# Patient Record
Sex: MALE | Race: WHITE | Employment: PART TIME | URBAN - METROPOLITAN AREA
[De-identification: names, ages, dates, MRNs, and addresses within clinical notes are randomized per-mention and may not be internally consistent; named-entity substitution may affect disease eponyms.]

---

## 2017-09-16 PROBLEM — S30.1XXA CONTUSION, ABDOMINAL WALL: Status: ACTIVE | Noted: 2017-09-16

## 2017-09-16 PROBLEM — S20.212A CONTUSION OF LEFT CHEST WALL: Status: ACTIVE | Noted: 2017-09-16
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
## 2018-06-27 ENCOUNTER — OFFICE VISIT (OUTPATIENT)
Dept: ORTHOPEDIC SURGERY | Age: 23
End: 2018-06-27
Payer: COMMERCIAL

## 2018-06-27 VITALS — BODY MASS INDEX: 35.78 KG/M2 | TEMPERATURE: 97.9 F | HEIGHT: 73 IN | WEIGHT: 270 LBS

## 2018-06-27 DIAGNOSIS — M22.2X2 PATELLOFEMORAL DISORDER OF LEFT KNEE: Primary | ICD-10-CM

## 2018-06-27 DIAGNOSIS — S82.402D FRACTURE TIBIA/FIBULA, LEFT, CLOSED, WITH ROUTINE HEALING, SUBSEQUENT ENCOUNTER: ICD-10-CM

## 2018-06-27 DIAGNOSIS — S82.202D FRACTURE TIBIA/FIBULA, LEFT, CLOSED, WITH ROUTINE HEALING, SUBSEQUENT ENCOUNTER: ICD-10-CM

## 2018-06-27 PROCEDURE — G8417 CALC BMI ABV UP PARAM F/U: HCPCS | Performed by: ORTHOPAEDIC SURGERY

## 2018-06-27 PROCEDURE — 1036F TOBACCO NON-USER: CPT | Performed by: ORTHOPAEDIC SURGERY

## 2018-06-27 PROCEDURE — G8427 DOCREV CUR MEDS BY ELIG CLIN: HCPCS | Performed by: ORTHOPAEDIC SURGERY

## 2018-06-27 PROCEDURE — 99214 OFFICE O/P EST MOD 30 MIN: CPT | Performed by: ORTHOPAEDIC SURGERY

## 2018-06-27 RX ORDER — MELOXICAM 15 MG/1
15 TABLET ORAL DAILY
Qty: 30 TABLET | Refills: 1 | Status: SHIPPED | OUTPATIENT
Start: 2018-06-27 | End: 2018-07-27

## 2023-02-17 ENCOUNTER — OFFICE VISIT (OUTPATIENT)
Dept: FAMILY MEDICINE CLINIC | Age: 28
End: 2023-02-17

## 2023-02-17 VITALS
HEART RATE: 106 BPM | RESPIRATION RATE: 16 BRPM | BODY MASS INDEX: 39.89 KG/M2 | TEMPERATURE: 97.4 F | DIASTOLIC BLOOD PRESSURE: 80 MMHG | OXYGEN SATURATION: 99 % | WEIGHT: 301 LBS | SYSTOLIC BLOOD PRESSURE: 128 MMHG | HEIGHT: 73 IN

## 2023-02-17 DIAGNOSIS — Z11.3 SCREEN FOR STD (SEXUALLY TRANSMITTED DISEASE): ICD-10-CM

## 2023-02-17 DIAGNOSIS — Z11.3 SCREEN FOR STD (SEXUALLY TRANSMITTED DISEASE): Primary | ICD-10-CM

## 2023-02-17 NOTE — PROGRESS NOTES
23  Aura Cruz : 1995 Sex: male  Age 24 y.o. Subjective:  Chief Complaint   Patient presents with    Sexually Transmitted Diseases     Cautionary check for STD - no symptoms       HPI:   Aura Cruz , 24 y.o. male presents to the clinic for evaluation of STD screening today. The patient denies dysuria, hematuria, penile discharge, and rash. The patient also denies headache, fever, chest pain, abdominal pain, shortness of breath, and nausea / vomiting / diarrhea. The patient reports 3 sexual partners in the past 6 months. The patient reports using protective contraception. ROS:   Unless otherwise stated in this report the patient's positive and negative responses for review of systems for constitutional, eyes, ENT, cardiovascular, respiratory, gastrointestinal, neurological, , musculoskeletal, and integument systems and related systems to the presenting problem are either stated in the history of present illness or were not pertinent or were negative for the symptoms and/or complaints related to the presenting medical problem. Positives and pertinent negatives as per HPI. All others reviewed and are negative. PMH:   History reviewed. No pertinent past medical history. Past Surgical History:   Procedure Laterality Date    OTHER SURGICAL HISTORY      im nailing left tibia       History reviewed. No pertinent family history. Medications:   No current outpatient medications on file.     Allergies:   No Known Allergies    Social History:     Social History     Tobacco Use    Smoking status: Every Day     Types: Cigarettes    Smokeless tobacco: Never    Tobacco comments:     vape   Substance Use Topics    Alcohol use: No       Physical Exam:     Vitals:    23 1102   BP: 128/80   Pulse: (!) 106   Resp: 16   Temp: 97.4 °F (36.3 °C)   SpO2: 99%   Weight: (!) 301 lb (136.5 kg)   Height: 6' 1\" (1.854 m)       Physical Exam (PE)   Constitutional: Alert, development consistent with age. HENT:      Head: Normocephalic. Right Ear: External ear normal.      Left Ear: External ear normal.      Nose: Normal.      Mouth/Throat:     Mouth: Mucous membranes are moist.      Pharynx: Oropharynx is clear. Eyes: Pupils: Pupils are equal, round, and reactive to light. Neck: Normal ROM. Supple. Cardiovascular: Heart RRR without pathologic murmurs or gallops. Pulmonary: Respiratory effort normal.  Normal breath sounds. Abdomen: Soft, nontender, normal bowel sounds. Genitalia Exam: Declined  Skin:  Normal turgor. Warm, dry, without visible rash, unless noted elsewhere. Musculoskeletal: General: Normal strength / ROM. Neurological:  Orientation age-appropriate. Motor functions intact. Psychiatric: Mood and Affect: Mood normal. Behavior: Behavior normal    Testing:   (All laboratory and radiology results have been personally reviewed by myself)  Labs:  No results found for this visit on 02/17/23. Imaging: All Radiology results interpreted by Radiologist unless otherwise noted. No orders to display       Assessment / Plan:   The patient's vitals, allergies, medications, and past medical history have been reviewed. Artie Garcia was seen today for sexually transmitted diseases. Diagnoses and all orders for this visit:    Screen for STD (sexually transmitted disease)  -     C.trachomatis N.gonorrhoeae DNA, Urine; Future  -     Urine Trichomonas Evaluation; Future      - Disposition: home    - Educational material printed for patient's review and were included in patient instructions. After Visit Summary was given to patient at the end of visit. - Urine was sent for GC/CT testing today. Labs obtained and will call with results once available. Pt will be required to give his SSN to receive these results via phone.     - Advised to avoid sexual contact for at least 1 week after treatment to prevent spread.  The patient is to follow-up with PCP in the next 2-3 days for reevaluation. Red flag symptoms were also discussed with the patient today. If symptoms worsen the patient is to go directly to the emergency department for reevaluation and treatment. Pt verbalizes understanding and is in agreement with plan of care. All questions answered. SIGNATURE: ANNALISE Rosales    *NOTE: This report was transcribed using voice recognition software. Every effort was made to ensure accuracy; however, inadvertent computerized transcription errors may be present.

## 2023-02-22 LAB
C. TRACHOMATIS DNA ,URINE: NEGATIVE
CULTURE, TRICHOMONAS: NORMAL
N. GONORRHOEAE DNA, URINE: NEGATIVE
SOURCE: NORMAL